# Patient Record
Sex: MALE | Race: OTHER | HISPANIC OR LATINO | URBAN - METROPOLITAN AREA
[De-identification: names, ages, dates, MRNs, and addresses within clinical notes are randomized per-mention and may not be internally consistent; named-entity substitution may affect disease eponyms.]

---

## 2018-07-03 ENCOUNTER — EMERGENCY (EMERGENCY)
Facility: HOSPITAL | Age: 23
LOS: 1 days | Discharge: HOME | End: 2018-07-03
Attending: EMERGENCY MEDICINE | Admitting: EMERGENCY MEDICINE
Payer: MEDICAID

## 2018-07-03 VITALS
RESPIRATION RATE: 16 BRPM | HEART RATE: 67 BPM | DIASTOLIC BLOOD PRESSURE: 68 MMHG | SYSTOLIC BLOOD PRESSURE: 115 MMHG | TEMPERATURE: 98 F | OXYGEN SATURATION: 100 %

## 2018-07-03 PROCEDURE — 99282 EMERGENCY DEPT VISIT SF MDM: CPT

## 2018-07-03 NOTE — ED ADULT TRIAGE NOTE - CHIEF COMPLAINT QUOTE
Pt c/o pain under right eye x 2 months, denies vision changes, very minor swelling noted, pt appears comfortable. Denies trauma to area.

## 2018-07-03 NOTE — ED PROVIDER NOTE - OBJECTIVE STATEMENT
translation phone # [phone] iph [phone] healthy male c/o R lower eyelid pain and swelling  mild for 2 months the worse for few days. No vision change, no injury. Review of Symptoms in all systems otherwise negative, except as indicated

## 2018-07-03 NOTE — ED PROVIDER NOTE - PROGRESS NOTE DETAILS
dc instructions given:  Seek immediate medical care for any new/worsening signs or symptoms. Apply warm compress for 15 minutes at least 4 times daily, Then massage area gently Use Erythromycin ointment twice daily as shown Follow up in Eye Clinic: [phone]

## 2019-03-14 ENCOUNTER — EMERGENCY (EMERGENCY)
Facility: HOSPITAL | Age: 24
LOS: 1 days | Discharge: HOME | End: 2019-03-14
Admitting: EMERGENCY MEDICINE
Payer: MEDICAID

## 2019-03-14 VITALS
OXYGEN SATURATION: 98 % | RESPIRATION RATE: 15 BRPM | HEART RATE: 75 BPM | SYSTOLIC BLOOD PRESSURE: 103 MMHG | TEMPERATURE: 98 F | DIASTOLIC BLOOD PRESSURE: 57 MMHG

## 2019-03-14 PROCEDURE — 99283 EMERGENCY DEPT VISIT LOW MDM: CPT

## 2019-03-14 RX ORDER — CEPHALEXIN 500 MG
500 CAPSULE ORAL ONCE
Qty: 0 | Refills: 0 | Status: COMPLETED | OUTPATIENT
Start: 2019-03-14 | End: 2019-03-14

## 2019-03-14 RX ORDER — CEPHALEXIN 500 MG
1 CAPSULE ORAL
Qty: 21 | Refills: 0 | OUTPATIENT
Start: 2019-03-14 | End: 2019-03-20

## 2019-03-14 RX ADMIN — Medication 500 MILLIGRAM(S): at 16:15

## 2019-03-14 NOTE — ED PROVIDER NOTE - CLINICAL SUMMARY MEDICAL DECISION MAKING FREE TEXT BOX
left periorbital erythema and mild swelling, EOMI, no visual changes;  will treat with keflex and have pt follow up with optho clinic.

## 2019-03-14 NOTE — ED PROVIDER NOTE - NSFOLLOWUPINSTRUCTIONS_ED_ALL_ED_FT
English Take keflex 500mg every 8 hours x 7 days.  Apply warm compresses to left eye 20 min/4x/day.  Follow up with ophthalmology clinic [phone] this week.  Return to ED for any worsening redness, swelling, visual changes.      [place] keflex 500 mg cada 8 horas x 7 días.  Aplique compresas tibias en el kathy peg 20 min / 4x / día.  Irma un seguimiento con la clínica de oftalmología [phone] esta semana.  Regrese a la ED para detectar cualquier empeoramiento de enrojecimiento, hinchazón, cambios visuales.

## 2019-03-14 NOTE — ED PROVIDER NOTE - OBJECTIVE STATEMENT
25 yo male c no sig pmhx presents to ED c/o redness and swelling under left eye x 2 days.  Pt states has had a stye to the side of his right eye x 8 months and also has one in the left eye.  Denies any eye pain, visual changes, fevers, headache.

## 2020-04-04 ENCOUNTER — EMERGENCY (EMERGENCY)
Facility: HOSPITAL | Age: 25
LOS: 1 days | Discharge: HOME | End: 2020-04-04
Attending: PEDIATRICS | Admitting: PEDIATRICS
Payer: SELF-PAY

## 2020-04-04 VITALS
SYSTOLIC BLOOD PRESSURE: 135 MMHG | TEMPERATURE: 99 F | RESPIRATION RATE: 18 BRPM | OXYGEN SATURATION: 98 % | HEART RATE: 83 BPM | DIASTOLIC BLOOD PRESSURE: 93 MMHG

## 2020-04-04 VITALS — OXYGEN SATURATION: 100 %

## 2020-04-04 PROCEDURE — 71045 X-RAY EXAM CHEST 1 VIEW: CPT | Mod: 26

## 2020-04-04 PROCEDURE — 93010 ELECTROCARDIOGRAM REPORT: CPT

## 2020-04-04 PROCEDURE — 99283 EMERGENCY DEPT VISIT LOW MDM: CPT | Mod: 25

## 2020-04-04 NOTE — ED PROVIDER NOTE - ATTENDING CONTRIBUTION TO CARE
The resident's documentation has been prepared under my direction and personally reviewed by me in its entirety. I confirm that the note above accurately reflects all work, treatment, procedures, and medical decision making performed by me.  Tianna Esteban MD

## 2020-04-04 NOTE — ED PROVIDER NOTE - OBJECTIVE STATEMENT
24yo M no PMH presenting with complaints of flu-like symptoms. came in contact with covid+ patient about 1 week ago. since that time has had subjective fevers, sore throat and diarrhea. fever and sore throat has been improving and diarrhea resolved yesterday. cough that has been progressively improving. last night he had palpitations with mild discomfort that lasted for about 3 hours and felt like he had trouble breathing during that time. palpitations and sob resolved and pt is feeling well at this time but was concerned. denies n/v, abd pain 24yo M no PMH presenting with complaints of flu-like symptoms. came in contact with covid+ patient about 1 week ago. since that time has had subjective fevers, sore throat and diarrhea. fever and sore throat has been improving and diarrhea resolved yesterday. cough that has been progressively improving. last night he had palpitations with mild discomfort that lasted for about 3 hours and felt like he had trouble breathing during that time. palpitations and sob resolved and pt is feeling well at this time but was concerned. denies n/v, abd pain.

## 2020-04-04 NOTE — ED PROVIDER NOTE - ADDITIONAL NOTES AND INSTRUCTIONS:
self isolation at home. can return to work/activity after being symptom free for 3 consecutive days.

## 2020-04-04 NOTE — ED PROVIDER NOTE - PATIENT PORTAL LINK FT
You can access the FollowMyHealth Patient Portal offered by [facility] by registering at the following website: [email or web address]. By joining [facility]’s FollowMyHealth portal, you will also be able to view your health information using other applications (apps) compatible with our system.

## 2020-04-04 NOTE — ED PROVIDER NOTE - CLINICAL SUMMARY MEDICAL DECISION MAKING FREE TEXT BOX
The patient does not have high-risk features of a life-threatening URI infection (COVID or otherwise).  There is no severe shortness of breath, light-headedness, or inability to perform that would indicated impending respiratory failure.  I have had a long conversation with the patient regarding the need for return to the ED:  worsening cough, shortness of breath, syncope, near-syncope, or any other concerns. symptoms are improving, no covid swab sent at this time. discussed continuing to self isolate which he has been doing for the past week. Pt with episode of palpitations and SOB lasting for 3 hours last night, now resolved. pt well appearing, unremarkable PE, no known risk factors. will obtain ekg and CXR. likely discharge. The patient does not have high-risk features of a life-threatening URI infection (COVID or otherwise).  There is no severe shortness of breath, light-headedness, or inability to perform that would indicated impending respiratory failure.  I have had a long conversation with the patient regarding the need for return to the ED:  worsening cough, shortness of breath, syncope, near-syncope, or any other concerns. symptoms are improving, no covid swab sent at this time. discussed continuing to self isolate which he has been doing for the past week. Pt with episode of palpitations and SOB lasting for 3 hours last night, now resolved. walking O2 sat of 99%. pt well appearing, unremarkable PE, no known risk factors. will obtain ekg and CXR. likely discharge.

## 2021-06-12 NOTE — ED ADULT TRIAGE NOTE - BP NONINVASIVE DIASTOLIC (MM HG)
Group Topic: BH Therapeutic Activity    Date: 6/12/2021  Start Time: 1000  End Time: 1045  Facilitators: Catherine S Fahres, OT    Focus: Distress Tolerance  Number in attendance: 5    Method: Group  Attendance: Present  Participation: Moderate  Patient Response: Quiet  Mood: Depressed  Affect: Type: Depressed   Range: Blunted/flat   Congruency: Congruent   Stability: Stable  Behavior/Socialization: Appropriate to group  Thought Process: Tracking  Task Performance: Follows directions  Patient Evaluation: Encouragement - needs prompts   68

## 2021-09-20 NOTE — ED PROVIDER NOTE - NS_EDPROVIDERDISPOUSERTYPE_ED_A_ED
Silver Nitrate Text: The wound bed was treated with silver nitrate after the biopsy was performed. Detail Level: Detailed Anesthesia Volume In Cc: 1 Hide Anticipated Plan (Based On Presumed Biopsy Results)?: No Attending Attestation (For Attendings USE Only)... Post-Care Instructions: I reviewed with the patient in detail post-care instructions. Patient is to keep the biopsy site dry overnight, and then apply bacitracin twice daily until healed. Patient may apply hydrogen peroxide soaks to remove any crusting. Anesthesia Type: 1% lidocaine with 1:100,000 epinephrine Billing Type: Third-Party Bill Electrodesiccation Text: The wound bed was treated with electrodesiccation after the biopsy was performed. Hemostasis: Electrocautery Cryotherapy Text: The wound bed was treated with cryotherapy after the biopsy was performed. Information: Selecting Yes will display possible errors in your note based on the variables you have selected. This validation is only offered as a suggestion for you. PLEASE NOTE THAT THE VALIDATION TEXT WILL BE REMOVED WHEN YOU FINALIZE YOUR NOTE. IF YOU WANT TO FAX A PRELIMINARY NOTE YOU WILL NEED TO TOGGLE THIS TO 'NO' IF YOU DO NOT WANT IT IN YOUR FAXED NOTE. X Size Of Lesion In Cm: 0 Curettage Text: The wound bed was treated with curettage after the biopsy was performed. Dressing: Band-Aid Biopsy Type: H and E Electrodesiccation And Curettage Text: The wound bed was treated with electrodesiccation and curettage after the biopsy was performed. Type Of Destruction Used: Curettage Was A Bandage Applied: Yes Wound Care: Aquaphor Consent: Verbal consent was obtained and risks were reviewed including but not limited to scarring, infection, bleeding, scabbing, incomplete removal, nerve damage and allergy to anesthesia. Biopsy Method: Personna blade Notification Instructions: Patient will be notified of biopsy results. However, patient instructed to call the office if not contacted within 2 weeks. Depth Of Biopsy: dermis

## 2025-04-29 NOTE — ED PROVIDER NOTE - CONSTITUTIONAL, MLM
Caller returning call to Clinical Team- Connected to CMA- Therese- CONNECT CALL TO CALL CENTER CMA QUEUE- ROUTE MESSAGE TO CALL CENTER [facility] POOL ([phone]) .      normal... Well appearing, awake, alert, oriented to person, place, time/situation and in no apparent distress.